# Patient Record
Sex: FEMALE | Race: WHITE | NOT HISPANIC OR LATINO | Employment: OTHER | URBAN - METROPOLITAN AREA
[De-identification: names, ages, dates, MRNs, and addresses within clinical notes are randomized per-mention and may not be internally consistent; named-entity substitution may affect disease eponyms.]

---

## 2020-04-12 ENCOUNTER — HOSPITAL ENCOUNTER (EMERGENCY)
Facility: HOSPITAL | Age: 70
Discharge: HOME | End: 2020-04-12
Attending: EMERGENCY MEDICINE | Admitting: EMERGENCY MEDICINE
Payer: MEDICARE

## 2020-04-12 VITALS
OXYGEN SATURATION: 97 % | BODY MASS INDEX: 46.37 KG/M2 | TEMPERATURE: 98.2 F | HEIGHT: 59 IN | HEART RATE: 78 BPM | SYSTOLIC BLOOD PRESSURE: 156 MMHG | RESPIRATION RATE: 18 BRPM | WEIGHT: 230 LBS | DIASTOLIC BLOOD PRESSURE: 76 MMHG

## 2020-04-12 DIAGNOSIS — R42 VERTIGO: Primary | ICD-10-CM

## 2020-04-12 LAB
ALBUMIN SERPL BCP-MCNC: 4.3 G/DL (ref 3.5–5.7)
ALP SERPL-CCNC: 127 U/L (ref 55–165)
ALT SERPL W P-5'-P-CCNC: 26 U/L (ref 7–52)
ANION GAP SERPL CALCULATED.3IONS-SCNC: 8 MMOL/L (ref 4–13)
AST SERPL W P-5'-P-CCNC: 27 U/L (ref 13–39)
BASOPHILS # BLD AUTO: 0 THOUSANDS/ΜL (ref 0–0.1)
BASOPHILS NFR BLD AUTO: 1 % (ref 0–2)
BILIRUB SERPL-MCNC: 0.9 MG/DL (ref 0.2–1)
BILIRUB UR QL STRIP: NEGATIVE
BUN SERPL-MCNC: 10 MG/DL (ref 7–25)
CALCIUM SERPL-MCNC: 9.3 MG/DL (ref 8.6–10.5)
CHLORIDE SERPL-SCNC: 103 MMOL/L (ref 98–107)
CLARITY UR: CLEAR
CO2 SERPL-SCNC: 30 MMOL/L (ref 21–31)
COLOR UR: YELLOW
CREAT SERPL-MCNC: 0.65 MG/DL (ref 0.6–1.2)
EOSINOPHIL # BLD AUTO: 0 THOUSAND/ΜL (ref 0–0.61)
EOSINOPHIL NFR BLD AUTO: 0 % (ref 0–5)
ERYTHROCYTE [DISTWIDTH] IN BLOOD BY AUTOMATED COUNT: 13.9 % (ref 11.5–14.5)
GFR SERPL CREATININE-BSD FRML MDRD: 90 ML/MIN/1.73SQ M
GLUCOSE SERPL-MCNC: 117 MG/DL (ref 65–99)
GLUCOSE UR STRIP-MCNC: NEGATIVE MG/DL
HCT VFR BLD AUTO: 42.5 % (ref 42–47)
HGB BLD-MCNC: 13.8 G/DL (ref 12–16)
HGB UR QL STRIP.AUTO: NEGATIVE
KETONES UR STRIP-MCNC: NEGATIVE MG/DL
LEUKOCYTE ESTERASE UR QL STRIP: NEGATIVE
LYMPHOCYTES # BLD AUTO: 1.2 THOUSANDS/ΜL (ref 0.6–4.47)
LYMPHOCYTES NFR BLD AUTO: 13 % (ref 21–51)
MCH RBC QN AUTO: 29.7 PG (ref 26–34)
MCHC RBC AUTO-ENTMCNC: 32.4 G/DL (ref 31–37)
MCV RBC AUTO: 92 FL (ref 81–99)
MONOCYTES # BLD AUTO: 0.6 THOUSAND/ΜL (ref 0.17–1.22)
MONOCYTES NFR BLD AUTO: 6 % (ref 2–12)
NEUTROPHILS # BLD AUTO: 7.5 THOUSANDS/ΜL (ref 1.4–6.5)
NEUTS SEG NFR BLD AUTO: 81 % (ref 42–75)
NITRITE UR QL STRIP: NEGATIVE
PH UR STRIP.AUTO: 7.5 [PH]
PLATELET # BLD AUTO: 246 THOUSANDS/UL (ref 149–390)
PMV BLD AUTO: 7 FL (ref 8.6–11.7)
POTASSIUM SERPL-SCNC: 3.8 MMOL/L (ref 3.5–5.5)
PROT SERPL-MCNC: 7.4 G/DL (ref 6.4–8.9)
PROT UR STRIP-MCNC: NEGATIVE MG/DL
RBC # BLD AUTO: 4.63 MILLION/UL (ref 3.9–5.2)
SODIUM SERPL-SCNC: 141 MMOL/L (ref 134–143)
SP GR UR STRIP.AUTO: <=1.005 (ref 1–1.03)
TROPONIN I SERPL-MCNC: <0.03 NG/ML
UROBILINOGEN UR QL STRIP.AUTO: 0.2 E.U./DL
WBC # BLD AUTO: 9.3 THOUSAND/UL (ref 4.8–10.8)

## 2020-04-12 PROCEDURE — 84484 ASSAY OF TROPONIN QUANT: CPT | Performed by: EMERGENCY MEDICINE

## 2020-04-12 PROCEDURE — 96374 THER/PROPH/DIAG INJ IV PUSH: CPT

## 2020-04-12 PROCEDURE — 81003 URINALYSIS AUTO W/O SCOPE: CPT

## 2020-04-12 PROCEDURE — 99284 EMERGENCY DEPT VISIT MOD MDM: CPT | Performed by: EMERGENCY MEDICINE

## 2020-04-12 PROCEDURE — 36415 COLL VENOUS BLD VENIPUNCTURE: CPT | Performed by: EMERGENCY MEDICINE

## 2020-04-12 PROCEDURE — 99284 EMERGENCY DEPT VISIT MOD MDM: CPT

## 2020-04-12 PROCEDURE — 85025 COMPLETE CBC W/AUTO DIFF WBC: CPT | Performed by: EMERGENCY MEDICINE

## 2020-04-12 PROCEDURE — 93005 ELECTROCARDIOGRAM TRACING: CPT

## 2020-04-12 PROCEDURE — 80053 COMPREHEN METABOLIC PANEL: CPT | Performed by: EMERGENCY MEDICINE

## 2020-04-12 RX ORDER — ONDANSETRON 2 MG/ML
4 INJECTION INTRAMUSCULAR; INTRAVENOUS ONCE
Status: COMPLETED | OUTPATIENT
Start: 2020-04-12 | End: 2020-04-12

## 2020-04-12 RX ORDER — MECLIZINE HYDROCHLORIDE 25 MG/1
25 TABLET ORAL 3 TIMES DAILY PRN
Qty: 30 TABLET | Refills: 0 | Status: SHIPPED | OUTPATIENT
Start: 2020-04-12

## 2020-04-12 RX ORDER — MECLIZINE HCL 12.5 MG/1
25 TABLET ORAL ONCE
Status: COMPLETED | OUTPATIENT
Start: 2020-04-12 | End: 2020-04-12

## 2020-04-12 RX ORDER — ONDANSETRON 4 MG/1
4 TABLET, ORALLY DISINTEGRATING ORAL EVERY 8 HOURS PRN
Qty: 20 TABLET | Refills: 0 | Status: SHIPPED | OUTPATIENT
Start: 2020-04-12

## 2020-04-12 RX ADMIN — ONDANSETRON 4 MG: 2 INJECTION INTRAMUSCULAR; INTRAVENOUS at 13:42

## 2020-04-12 RX ADMIN — MECLIZINE 25 MG: 12.5 TABLET ORAL at 13:42

## 2020-04-13 LAB
ATRIAL RATE: 96 BPM
P AXIS: 69 DEGREES
PR INTERVAL: 164 MS
QRS AXIS: 49 DEGREES
QRSD INTERVAL: 78 MS
QT INTERVAL: 350 MS
QTC INTERVAL: 442 MS
T WAVE AXIS: 51 DEGREES
VENTRICULAR RATE: 96 BPM

## 2020-04-13 PROCEDURE — 93010 ELECTROCARDIOGRAM REPORT: CPT | Performed by: INTERNAL MEDICINE

## 2021-04-22 ENCOUNTER — TRANSCRIBE ORDERS (OUTPATIENT)
Dept: ADMINISTRATIVE | Facility: HOSPITAL | Age: 71
End: 2021-04-22

## 2021-04-22 DIAGNOSIS — R60.0 BILATERAL LEG EDEMA: Primary | ICD-10-CM

## 2021-04-30 ENCOUNTER — HOSPITAL ENCOUNTER (OUTPATIENT)
Dept: NON INVASIVE DIAGNOSTICS | Facility: HOSPITAL | Age: 71
Discharge: HOME | End: 2021-04-30
Payer: MEDICARE

## 2021-04-30 DIAGNOSIS — R60.0 BILATERAL LEG EDEMA: ICD-10-CM

## 2021-04-30 PROCEDURE — 93306 TTE W/DOPPLER COMPLETE: CPT | Performed by: INTERNAL MEDICINE

## 2021-04-30 PROCEDURE — C8929 TTE W OR WO FOL WCON,DOPPLER: HCPCS

## 2021-04-30 RX ADMIN — PERFLUTREN 0.4 ML/MIN: 6.52 INJECTION, SUSPENSION INTRAVENOUS at 14:04

## 2021-05-12 ENCOUNTER — TRANSCRIBE ORDERS (OUTPATIENT)
Dept: ADMINISTRATIVE | Facility: HOSPITAL | Age: 71
End: 2021-05-12

## 2021-05-12 DIAGNOSIS — R60.0 BILATERAL LEG EDEMA: Primary | ICD-10-CM

## 2021-05-28 ENCOUNTER — HOSPITAL ENCOUNTER (OUTPATIENT)
Dept: NON INVASIVE DIAGNOSTICS | Facility: HOSPITAL | Age: 71
Discharge: HOME | End: 2021-05-28
Payer: MEDICARE

## 2021-05-28 DIAGNOSIS — R60.0 BILATERAL LEG EDEMA: ICD-10-CM

## 2021-05-28 PROCEDURE — 93970 EXTREMITY STUDY: CPT | Performed by: SURGERY

## 2021-05-28 PROCEDURE — 93970 EXTREMITY STUDY: CPT

## 2021-08-24 NOTE — PROGRESS NOTES
PT Evaluation     Today's date: 2021  Patient name: Michelle Cortes  : 1950  MRN: [id number]  Referring provider: Andreas Teran DO  Dx:   Encounter Diagnosis     ICD-10-CM    1  Lymphedema  I89 0                   Assessment  Assessment details: Patient presents to OPPT with s/s consistent with bilateral LE lymphedema  PT interventions to include CDT (complete decongestive therapy) including MLD (manual lymphatic drainage) and compression using short stretch bandages as able  PT to further provide extensive patient education on self bandaging, self MLD techniques, and skin care  Patient will transition patient to long term maintenance with compression plan for both morning and evening wear once optimal decongestive and volume reduction of limb has been achieved  Thank you for this referral and the opportunity to participate in the care of this patient  Impairments: abnormal gait, abnormal or restricted ROM, abnormal movement, activity intolerance, impaired balance, impaired physical strength, lacks appropriate home exercise program and safety issue  Other impairment: Increased LE Edema  Understanding of Dx/Px/POC: good   Prognosis: good  Prognosis details: Positive prognostic indicators include positive attitude toward recovery and good understanding of diagnosis and treatment plan options  Negative prognostic indicators include chronicity of symptoms and co-morbidities          Goals  STGs to be achieved in 2 - 4 weeks  Demonstrate at least 75% compliance with self MLD  Demonstrate at least 75% compliance with self compression  Demonstrate at least 75% compliance with self skin and nail inspection  Free from s/s of infection  Demonstrate understanding of importance of compliance with POC    LTGs to be achieved in 4 - 6 weeks  Demonstrate at least 100% compliance with self MLD  Demonstrate at least 100% compliance with self compression  Demonstrate at least 100% compliance with self skin and nail inspection  Free from s/s of infection  Demonstrate understanding of day/night compression wearing schedule  Obtain alternative compression to ensure independence with self management    Plan  Patient would benefit from: skilled physical therapy  Other planned modality interventions: Modalities prn for symptom management  Planned therapy interventions: manual therapy, neuromuscular re-education, orthotic fitting/training, therapeutic activities, therapeutic exercise and home exercise program  Frequency: 2x week  Duration in visits: 8  Duration in weeks: 4  Plan of Care beginning date: 8/25/2021  Plan of Care expiration date: 9/24/2021  Treatment plan discussed with: PTA and patient        Subjective Evaluation    History of Present Illness  Mechanism of injury: Patient notes that in December she saw physician, had a 12 lb weight gain  Objective  Lymphedema Evaluation    Medical/MLD Precautions/Contraindications:  NEG Cardiac/CHF/Cardiac Edema/Cardiac arrhythmia  NEG Pulmonary  NEG Kidney  NEG Liver  NEG Current Fever/Infection  NEG Current/Recent Wounds  NEG Current/Recent Treatments/Interventions/Port Access/PICC Line/[place] filter  NEG Current/Recent/History of DVT or Clotting issues/Pelvic DVT  POS Age > 62 y/o  NEG HTN  NEG Malignancy  POS Hyper/hypothyroidism - medical management  NEG AAA  NEG GI disorder (Crohn's Diverticulitis, IBD)  NEG Liver disease  NEG Recent abdominal surgery  NEG Pregnancy  NEG Abdominal pain    Bandaging Precautions/Contraindications:  NEG Diabetes  POS Neuropathy- right ankle/foot  NEG Vascular problems/insufficiency  NEG Arterial Disease    Other:  NEG Latex allergies  NEG Pacemaker    ROM Considerations:  Soft tissue approximation    Strength Considerations:  WFL for ambulation, difficulty with hip flexion  Gait Considerations:  Patient with symmetry of gait and adequate foot clearance to allow for safe placement of LE compression to Knee    Patient ambulates independently with assist on level surfaces with RW      Skin Considerations/Scars:  Patient presents with skin inspection as follows:  Hemosiderin staining/skin discoloration - POS  Finger/Toe Nails - POS  Open Wounds/Size/Color - None at present, multiple stages of healing  S/S infection - NEG  Firm/Sponge/Hard - POS  Peau D' Orange/Papillomas - POS  Lymphorrhea/Weeping - NEG  Skin mobility - Poor  Skin temperature - WFL  Fungal infection - NEG  Lymph fistula - NEG  Lipodermatosclerosis - NEG    Girth:    LE girth measurements (cm)      Right           Left   Forefoot             19 5  20                           Metatarsals             21  20 5  Malleolus   25 5  26  +4 cm               28  30  +8 cm    35  35 5  +12 cm               39 5  45                         +16 cm    46 5  56  +20 cm                          49  58   +24 cm    50  55  +28 cm                                  52 5  49  +32 cm    55  60  +36 cm    60  63 5  +40 cm    72  66  +44 cm    82  74       Precautions: Falls, uses RW, right ankle sx  Re-eval Date: 9/24/2021    Date 8/25       Visit Count 1       FOTO See IE       Pain In See IE       Pain Out See IE               Manuals        MLD        Trial pneumatic compression pump                        Neuro Re-Ed                        Ther Ex        Aerobic        Lymph exercises                        Ther Activity        Self bandaging, ADLs                Gait Training                        Modalities

## 2021-08-25 ENCOUNTER — EVALUATION (OUTPATIENT)
Dept: PHYSICAL THERAPY | Facility: CLINIC | Age: 71
End: 2021-08-25
Payer: MEDICARE

## 2021-08-25 DIAGNOSIS — I89.0 LYMPHEDEMA: Primary | ICD-10-CM

## 2021-08-25 PROCEDURE — 97162 PT EVAL MOD COMPLEX 30 MIN: CPT | Performed by: PHYSICAL THERAPIST

## 2021-08-25 NOTE — LETTER
2021    Ronnie Hoyos DO  [address] [facility] [address] [facility]    Patient: Michell Solitario   YOB: 1950   Date of Visit: 2021     Encounter Diagnosis     ICD-10-CM    1  Lymphedema  I89 0        Dear Dr Ariana Scott:    Thank you for your recent referral of Michell Solitario  Please review the attached evaluation summary from Rani's recent visit  Please verify that you agree with the plan of care by signing the attached order  If you have any questions or concerns, please do not hesitate to call  I sincerely appreciate the opportunity to share in the care of one of your patients and hope to have another opportunity to work with you in the near future  Sincerely,    Arthur Brito      Referring Provider:      I certify that I have read the below Plan of Care and certify the need for these services furnished under this plan of treatment while under my care  Ronnie Hoyos, [address] [place] [address]  Via Fax: [phone]          PT Evaluation     Today's date: 2021  Patient name: Michell Solitario  : 1950  MRN: [id number]  Referring provider: Hudson Looney DO  Dx:   Encounter Diagnosis     ICD-10-CM    1  Lymphedema  I89 0                   Assessment  Assessment details: Patient presents to OPPT with s/s consistent with bilateral LE lymphedema  PT interventions to include CDT (complete decongestive therapy) including MLD (manual lymphatic drainage) and compression using short stretch bandages as able  PT to further provide extensive patient education on self bandaging, self MLD techniques, and skin care  Patient will transition patient to long term maintenance with compression plan for both morning and evening wear once optimal decongestive and volume reduction of limb has been achieved  Thank you for this referral and the opportunity to participate in the care of this patient      Impairments: abnormal gait, abnormal or restricted ROM, abnormal movement, activity intolerance, impaired balance, impaired physical strength, lacks appropriate home exercise program and safety issue  Other impairment: Increased LE Edema  Understanding of Dx/Px/POC: good   Prognosis: good  Prognosis details: Positive prognostic indicators include positive attitude toward recovery and good understanding of diagnosis and treatment plan options  Negative prognostic indicators include chronicity of symptoms and co-morbidities  Goals  STGs to be achieved in 2 - 4 weeks  Demonstrate at least 75% compliance with self MLD  Demonstrate at least 75% compliance with self compression  Demonstrate at least 75% compliance with self skin and nail inspection  Free from s/s of infection  Demonstrate understanding of importance of compliance with POC    LTGs to be achieved in 4 - 6 weeks  Demonstrate at least 100% compliance with self MLD  Demonstrate at least 100% compliance with self compression  Demonstrate at least 100% compliance with self skin and nail inspection  Free from s/s of infection  Demonstrate understanding of day/night compression wearing schedule  Obtain alternative compression to ensure independence with self management    Plan  Patient would benefit from: skilled physical therapy  Other planned modality interventions: Modalities prn for symptom management  Planned therapy interventions: manual therapy, neuromuscular re-education, orthotic fitting/training, therapeutic activities, therapeutic exercise and home exercise program  Frequency: 2x week  Duration in visits: 8  Duration in weeks: 4  Plan of Care beginning date: 8/25/2021  Plan of Care expiration date: 9/24/2021  Treatment plan discussed with: PTA and patient        Subjective Evaluation    History of Present Illness  Mechanism of injury: Patient notes that in December she saw physician, had a 12 lb weight gain            Objective  Lymphedema Evaluation    Medical/MLD Precautions/Contraindications:  NEG Cardiac/CHF/Cardiac Edema/Cardiac arrhythmia  NEG Pulmonary  NEG Kidney  NEG Liver  NEG Current Fever/Infection  NEG Current/Recent Wounds  NEG Current/Recent Treatments/Interventions/Port Access/PICC Line/[place] filter  NEG Current/Recent/History of DVT or Clotting issues/Pelvic DVT  POS Age > 62 y/o  NEG HTN  NEG Malignancy  POS Hyper/hypothyroidism - medical management  NEG AAA  NEG GI disorder (Crohn's Diverticulitis, IBD)  NEG Liver disease  NEG Recent abdominal surgery  NEG Pregnancy  NEG Abdominal pain    Bandaging Precautions/Contraindications:  NEG Diabetes  POS Neuropathy- right ankle/foot  NEG Vascular problems/insufficiency  NEG Arterial Disease    Other:  NEG Latex allergies  NEG Pacemaker    ROM Considerations:  Soft tissue approximation    Strength Considerations:  WFL for ambulation, difficulty with hip flexion  Gait Considerations:  Patient with symmetry of gait and adequate foot clearance to allow for safe placement of LE compression to Knee  Patient ambulates independently with assist on level surfaces with RW      Skin Considerations/Scars:  Patient presents with skin inspection as follows:  Hemosiderin staining/skin discoloration - POS  Finger/Toe Nails - POS  Open Wounds/Size/Color - None at present, multiple stages of healing  S/S infection - NEG  Firm/Sponge/Hard - POS  Peau D' Orange/Papillomas - POS  Lymphorrhea/Weeping - NEG  Skin mobility - Poor  Skin temperature - WFL  Fungal infection - NEG  Lymph fistula - NEG  Lipodermatosclerosis - NEG    Girth:    LE girth measurements (cm)      Right           Left   Forefoot             19 5  20                           Metatarsals             21  20 5  Malleolus   25 5  26  +4 cm               28  30  +8 cm    35  35 5  +12 cm               39 5  45                         +16 cm    46 5  56  +20 cm                          49  58   +24 cm    50  55  +28 cm                                  52 5  49  +32 cm    55  60  +36 cm    60  63 5  +40 cm    72  66  +44 cm    82  74       Precautions: Falls, uses RW, right ankle sx  Re-eval Date: 9/24/2021    Date 8/25       Visit Count 1       FOTO See IE       Pain In See IE       Pain Out See IE               Manuals        MLD        Trial pneumatic compression pump                        Neuro Re-Ed                        Ther Ex        Aerobic        Lymph exercises                        Ther Activity        Self bandaging, ADLs                Gait Training                        Modalities

## 2021-09-16 ENCOUNTER — APPOINTMENT (OUTPATIENT)
Dept: PHYSICAL THERAPY | Facility: CLINIC | Age: 71
End: 2021-09-16
Payer: MEDICARE

## 2021-09-22 ENCOUNTER — OFFICE VISIT (OUTPATIENT)
Dept: PHYSICAL THERAPY | Facility: CLINIC | Age: 71
End: 2021-09-22
Payer: MEDICARE

## 2021-09-22 DIAGNOSIS — I89.0 LYMPHEDEMA: Primary | ICD-10-CM

## 2021-09-22 PROCEDURE — 97140 MANUAL THERAPY 1/> REGIONS: CPT | Performed by: PHYSICAL THERAPIST

## 2021-09-22 NOTE — PROGRESS NOTES
Daily Note     Today's date: 2021  Patient name: Kerline Bautista  : 1950  MRN: [id number]  Referring provider: Preston Emerson DO  Dx:   Encounter Diagnosis     ICD-10-CM    1  Lymphedema  I89 0                   Subjective: Patient reports she has been using her pumps  Reports not using them while she was sick, but getting back on track  Objective: See treatment diary below      Assessment: Tolerated treatment well  Patient demonstrated fatigue post treatment and would benefit from continued PT  Patient with improved, but ongoing LE edema, right > left  Plan: Continue per plan of care        Precautions: Falls, uses RW, right ankle sx  Re-eval Date: 2021    Date       Visit Count 1 2      FOTO See IE       Pain In See IE Knee and ankle pain, neuropathy      Pain Out See IE               Manuals        MLD  55 mins to left LE      Trial pneumatic compression pump                        Neuro Re-Ed                        Ther Ex        Aerobic        Lymph exercises                        Ther Activity        Self bandaging, ADLs                Gait Training                        Modalities

## 2021-10-01 ENCOUNTER — EVALUATION (OUTPATIENT)
Dept: PHYSICAL THERAPY | Facility: CLINIC | Age: 71
End: 2021-10-01
Payer: MEDICARE

## 2021-10-01 DIAGNOSIS — I89.0 LYMPHEDEMA: Primary | ICD-10-CM

## 2021-10-01 PROCEDURE — 97140 MANUAL THERAPY 1/> REGIONS: CPT | Performed by: PHYSICAL THERAPIST

## 2021-10-04 ENCOUNTER — OFFICE VISIT (OUTPATIENT)
Dept: PHYSICAL THERAPY | Facility: CLINIC | Age: 71
End: 2021-10-04
Payer: MEDICARE

## 2021-10-04 DIAGNOSIS — I89.0 LYMPHEDEMA: Primary | ICD-10-CM

## 2021-10-04 PROCEDURE — 97110 THERAPEUTIC EXERCISES: CPT

## 2021-10-04 PROCEDURE — 97140 MANUAL THERAPY 1/> REGIONS: CPT

## 2021-10-06 ENCOUNTER — OFFICE VISIT (OUTPATIENT)
Dept: PHYSICAL THERAPY | Facility: CLINIC | Age: 71
End: 2021-10-06
Payer: MEDICARE

## 2021-10-06 DIAGNOSIS — I89.0 LYMPHEDEMA: Primary | ICD-10-CM

## 2021-10-06 PROCEDURE — 97140 MANUAL THERAPY 1/> REGIONS: CPT

## 2021-10-11 ENCOUNTER — OFFICE VISIT (OUTPATIENT)
Dept: PHYSICAL THERAPY | Facility: CLINIC | Age: 71
End: 2021-10-11
Payer: MEDICARE

## 2021-10-11 DIAGNOSIS — I89.0 LYMPHEDEMA: Primary | ICD-10-CM

## 2021-10-11 PROCEDURE — 97140 MANUAL THERAPY 1/> REGIONS: CPT

## 2021-10-13 ENCOUNTER — OFFICE VISIT (OUTPATIENT)
Dept: PHYSICAL THERAPY | Facility: CLINIC | Age: 71
End: 2021-10-13
Payer: MEDICARE

## 2021-10-13 DIAGNOSIS — I89.0 LYMPHEDEMA: Primary | ICD-10-CM

## 2021-10-13 PROCEDURE — 97140 MANUAL THERAPY 1/> REGIONS: CPT

## 2021-10-18 ENCOUNTER — OFFICE VISIT (OUTPATIENT)
Dept: PHYSICAL THERAPY | Facility: CLINIC | Age: 71
End: 2021-10-18
Payer: MEDICARE

## 2021-10-18 DIAGNOSIS — I89.0 LYMPHEDEMA: Primary | ICD-10-CM

## 2021-10-18 PROCEDURE — 97140 MANUAL THERAPY 1/> REGIONS: CPT

## 2021-10-20 ENCOUNTER — OFFICE VISIT (OUTPATIENT)
Dept: PHYSICAL THERAPY | Facility: CLINIC | Age: 71
End: 2021-10-20
Payer: MEDICARE

## 2021-10-20 DIAGNOSIS — I89.0 LYMPHEDEMA: Primary | ICD-10-CM

## 2021-10-20 PROCEDURE — 97140 MANUAL THERAPY 1/> REGIONS: CPT

## 2021-10-25 ENCOUNTER — OFFICE VISIT (OUTPATIENT)
Dept: PHYSICAL THERAPY | Facility: CLINIC | Age: 71
End: 2021-10-25
Payer: MEDICARE

## 2021-10-25 DIAGNOSIS — I89.0 LYMPHEDEMA: Primary | ICD-10-CM

## 2021-10-25 PROCEDURE — 97140 MANUAL THERAPY 1/> REGIONS: CPT | Performed by: PHYSICAL THERAPIST

## 2021-10-27 ENCOUNTER — OFFICE VISIT (OUTPATIENT)
Dept: PHYSICAL THERAPY | Facility: CLINIC | Age: 71
End: 2021-10-27
Payer: MEDICARE

## 2021-10-27 DIAGNOSIS — I89.0 LYMPHEDEMA: Primary | ICD-10-CM

## 2021-10-27 PROCEDURE — 97140 MANUAL THERAPY 1/> REGIONS: CPT | Performed by: PHYSICAL THERAPIST

## 2021-12-07 ENCOUNTER — EVALUATION (OUTPATIENT)
Dept: PHYSICAL THERAPY | Facility: CLINIC | Age: 71
End: 2021-12-07
Payer: MEDICARE

## 2021-12-07 DIAGNOSIS — I89.0 LYMPHEDEMA: Primary | ICD-10-CM

## 2021-12-07 PROCEDURE — 97164 PT RE-EVAL EST PLAN CARE: CPT | Performed by: PHYSICAL THERAPIST

## 2021-12-07 PROCEDURE — 97140 MANUAL THERAPY 1/> REGIONS: CPT | Performed by: PHYSICAL THERAPIST

## 2021-12-28 ENCOUNTER — APPOINTMENT (OUTPATIENT)
Dept: PHYSICAL THERAPY | Facility: CLINIC | Age: 71
End: 2021-12-28
Payer: MEDICARE

## 2021-12-28 NOTE — PROGRESS NOTES
Daily Note     Today's date: 2021  Patient name: Joanna Prather  : 1950  MRN: [id number]  Referring provider: Modesto Griffiths DO  Dx: No diagnosis found  Subjective: ***      Objective: See treatment diary below      Assessment: Tolerated treatment {Tolerated treatment :[id number]}   Patient {assessment:7314356092}      Plan: {PLAN:4916130383}     Precautions: Falls, uses RW, right ankle sx  Re-eval Date: 2022    Date 8/25 9/22 10/1 12/7    Visit Count 1 2 3 4    FOTO See IE       Pain In See IE Knee and ankle pain, neuropathy Left knee pain with ROM     Pain Out See IE             Manuals        MLD  55 mins to left LE 55 mins to left LE, pneumatic pump to right LE 30 mins 1* focus   left LE    Trial pneumatic compression pump                        Neuro Re-Ed                        Ther Ex        Aerobic        Lymph exercises                        Ther Activity        Self bandaging, ADLs                Gait Training                        Modalities